# Patient Record
Sex: MALE | Race: WHITE | HISPANIC OR LATINO | Employment: STUDENT
[De-identification: names, ages, dates, MRNs, and addresses within clinical notes are randomized per-mention and may not be internally consistent; named-entity substitution may affect disease eponyms.]

---

## 2019-07-19 ENCOUNTER — HOSPITAL (OUTPATIENT)
Dept: OTHER | Age: 19
End: 2019-07-19

## 2019-07-19 LAB
AMORPH SED URNS QL MICRO: NORMAL
APPEARANCE UR: CLEAR
BILIRUB UR QL: NEGATIVE
CAOX CRY URNS QL MICRO: NORMAL
COLOR UR: YELLOW
EPITH CASTS #/AREA URNS LPF: NORMAL /[LPF]
FATTY CASTS #/AREA URNS LPF: NORMAL /[LPF]
GLUCOSE UR-MCNC: NEGATIVE MG/DL
GRAN CASTS #/AREA URNS LPF: NORMAL /[LPF]
HGB UR QL: NEGATIVE
HYALINE CASTS #/AREA URNS LPF: NORMAL /[LPF]
KETONES UR-MCNC: NEGATIVE MG/DL
LEUKOCYTE ESTERASE UR QL STRIP: NEGATIVE
MICROSCOPIC (MT): NORMAL
MIXED CELL CASTS #/AREA URNS LPF: NORMAL /[LPF]
MUCOUS THREADS URNS QL MICRO: NORMAL
NITRITE UR QL: NEGATIVE
PH UR: 5 UNITS (ref 5–7)
PROT UR QL: NEGATIVE MG/DL
RBC CASTS #/AREA URNS LPF: NORMAL /[LPF]
RENAL EPI CELLS #/AREA URNS HPF: NORMAL /[HPF]
SP GR UR: 1.02 (ref 1–1.03)
SPECIMEN SOURCE: NORMAL
SPERM URNS QL MICRO: NORMAL
T VAGINALIS URNS QL MICRO: NORMAL
TRI-PHOS CRY URNS QL MICRO: NORMAL
URATE CRY URNS QL MICRO: NORMAL
URNS CMNT MICRO: NORMAL
UROBILINOGEN UR QL: 0.2 MG/DL (ref 0–1)
WAXY CASTS #/AREA URNS LPF: NORMAL /[LPF]
WBC CASTS #/AREA URNS LPF: NORMAL /[LPF]
YEAST HYPHAE URNS QL MICRO: NORMAL
YEAST URNS QL MICRO: NORMAL

## 2020-07-25 ENCOUNTER — OFFICE VISIT (OUTPATIENT)
Dept: FAMILY MEDICINE | Age: 20
End: 2020-07-25

## 2020-07-25 VITALS
RESPIRATION RATE: 16 BRPM | WEIGHT: 189 LBS | BODY MASS INDEX: 24.26 KG/M2 | DIASTOLIC BLOOD PRESSURE: 78 MMHG | HEIGHT: 74 IN | HEART RATE: 98 BPM | TEMPERATURE: 99.3 F | OXYGEN SATURATION: 98 % | SYSTOLIC BLOOD PRESSURE: 120 MMHG

## 2020-07-25 DIAGNOSIS — E66.2 OBESITY HYPOVENTILATION SYNDROME (CMD): ICD-10-CM

## 2020-07-25 DIAGNOSIS — Z00.00 ANNUAL PHYSICAL EXAM: Primary | ICD-10-CM

## 2020-07-25 DIAGNOSIS — F17.210 CIGARETTE SMOKER: ICD-10-CM

## 2020-07-25 DIAGNOSIS — Z86.59 H/O: DEPRESSION: ICD-10-CM

## 2020-07-25 PROCEDURE — 99395 PREV VISIT EST AGE 18-39: CPT | Performed by: FAMILY MEDICINE

## 2020-07-25 PROCEDURE — 80061 LIPID PANEL: CPT | Performed by: FAMILY MEDICINE

## 2020-07-25 PROCEDURE — 80048 BASIC METABOLIC PNL TOTAL CA: CPT | Performed by: FAMILY MEDICINE

## 2020-07-25 PROCEDURE — 36415 COLL VENOUS BLD VENIPUNCTURE: CPT

## 2020-07-25 RX ORDER — FLUOXETINE HYDROCHLORIDE 20 MG/1
CAPSULE ORAL
COMMUNITY
Start: 2020-04-21

## 2020-07-25 SDOH — HEALTH STABILITY: MENTAL HEALTH: HOW OFTEN DO YOU HAVE A DRINK CONTAINING ALCOHOL?: NEVER

## 2020-07-26 LAB
ANION GAP SERPL CALC-SCNC: 10 MMOL/L (ref 10–20)
BUN SERPL-MCNC: 13 MG/DL (ref 6–20)
BUN/CREAT SERPL: 16 (ref 7–25)
CALCIUM SERPL-MCNC: 9.9 MG/DL (ref 8.4–10.2)
CHLORIDE SERPL-SCNC: 106 MMOL/L (ref 98–107)
CHOLEST SERPL-MCNC: 172 MG/DL
CHOLEST/HDLC SERPL: 3.8 {RATIO}
CO2 SERPL-SCNC: 30 MMOL/L (ref 21–32)
CREAT SERPL-MCNC: 0.83 MG/DL (ref 0.67–1.17)
GLUCOSE SERPL-MCNC: 101 MG/DL (ref 65–99)
HDLC SERPL-MCNC: 45 MG/DL
LDLC SERPL CALC-MCNC: 101 MG/DL
LENGTH OF FAST TIME PATIENT: ABNORMAL HRS
LENGTH OF FAST TIME PATIENT: ABNORMAL HRS
NONHDLC SERPL-MCNC: 127 MG/DL
POTASSIUM SERPL-SCNC: 4.6 MMOL/L (ref 3.4–5.1)
SODIUM SERPL-SCNC: 141 MMOL/L (ref 135–145)
TRIGL SERPL-MCNC: 130 MG/DL

## 2020-07-28 ENCOUNTER — TELEPHONE (OUTPATIENT)
Dept: SCHEDULING | Age: 20
End: 2020-07-28

## 2020-07-28 DIAGNOSIS — R93.89 CHEST X-RAY ABNORMALITY: Primary | ICD-10-CM

## 2020-08-05 ENCOUNTER — HOSPITAL ENCOUNTER (OUTPATIENT)
Dept: CT IMAGING | Age: 20
Discharge: HOME | End: 2020-08-05
Attending: FAMILY MEDICINE

## 2020-08-05 DIAGNOSIS — R93.89 CHEST X-RAY ABNORMALITY: ICD-10-CM

## 2020-08-05 PROCEDURE — 71250 CT THORAX DX C-: CPT

## 2024-03-11 PROBLEM — Z00.00 ENCOUNTER FOR PREVENTIVE HEALTH EXAMINATION: Status: ACTIVE | Noted: 2024-03-11

## 2024-03-12 ENCOUNTER — NON-APPOINTMENT (OUTPATIENT)
Age: 24
End: 2024-03-12

## 2024-03-14 ENCOUNTER — APPOINTMENT (OUTPATIENT)
Dept: SURGERY | Facility: CLINIC | Age: 24
End: 2024-03-14
Payer: COMMERCIAL

## 2024-03-14 VITALS
DIASTOLIC BLOOD PRESSURE: 73 MMHG | HEART RATE: 85 BPM | HEIGHT: 75 IN | WEIGHT: 315 LBS | SYSTOLIC BLOOD PRESSURE: 118 MMHG | BODY MASS INDEX: 39.17 KG/M2

## 2024-03-14 DIAGNOSIS — K80.20 CALCULUS OF GALLBLADDER W/OUT CHOLECYSTITIS W/OUT OBSTRUCTION: ICD-10-CM

## 2024-03-14 DIAGNOSIS — Z87.891 PERSONAL HISTORY OF NICOTINE DEPENDENCE: ICD-10-CM

## 2024-03-14 DIAGNOSIS — Z78.9 OTHER SPECIFIED HEALTH STATUS: ICD-10-CM

## 2024-03-14 DIAGNOSIS — F32.A DEPRESSION, UNSPECIFIED: ICD-10-CM

## 2024-03-14 PROCEDURE — 99203 OFFICE O/P NEW LOW 30 MIN: CPT

## 2024-03-14 NOTE — ASSESSMENT
[FreeTextEntry1] : IMP: Mr. MCKINLEY is a 23 year y/o M with symptomatic gallstones confirmed on imaging, and history and symptoms consistent with symptomatic cholelithiasis.

## 2024-03-14 NOTE — PLAN
[FreeTextEntry1] : Mr. LAM MCKINLEY was informed of significance of findings. All the options, risks and benefits were discussed at length, including the potential to go open, small potential for bleeding, CBD injury, bile leak, and other related complications. Informed consent for robotic assisted laparoscopic/possible open cholecystectomy and potential risks, benefits to the planned surgery were discussed in depth. All surgical options were discussed including non-surgical treatments. Patient wishes to proceed with surgery. We will plan for surgery at [facility], at the next available date, pending any required insurance pre-certification or pre-approval. Patient agrees to obtain any necessary pre-operative evaluations and testing prior to surgery. Patient advised to seek immediate medical attention with any acute change in symptoms or with the development of any new or worsening symptoms. Patient's questions and concerns addressed to patient's satisfaction, and patient verbalized an understanding of the information discussed.

## 2024-03-14 NOTE — DATA REVIEWED
[FreeTextEntry1] : Patient: LAM MCKINLEY YOB: 2000 Phone: [phone] MRN: [id number] Acc: [id number] Date of Exam: 02- EXAM:  ULTRASOUND ABDOMEN COMPLETE  HISTORY:  Male, 23 years-old with elevated liver function  TECHNIQUE:  Using real-time ultrasonography with a high-resolution broadband phased-array curved transducer, multiplanar gray scale images were obtained and supplemented with color Doppler. Static images are provided for review.  COMPARISON:  None  FINDINGS:   Abdominal Aorta/IVC: No evidence of abdominal aortic aneurysm. Visualized portions of the IVC were patent. Liver: Increased echogenicity is noted. It measures approximately 16 cm in length. Gallbladder: Cholelithiasis is noted. The walls do not appear thickened. Common Duct: Normal measuring 0.3 cm diameter at the jennifer hepatis. Pancreas: Obscured by overlying bowel Kidneys: Normal in size, morphology and cortical echotexture. There is no suspicious renal lesion. No hydronephrosis, shadowing calculi or perinephric fluid. Right Kidney: 13.4 cm in length. Left Kidney:   13.4 cm in length. Spleen: Normal size, contour and echotexture measuring 11.1 cm in length.  IMPRESSION: Cholelithiasis. No dilatation of the biliary tree is noted. Increased echogenicity of the liver suggestive of fatty infiltration or diffuse hepatocellular disease.

## 2024-03-14 NOTE — HISTORY OF PRESENT ILLNESS
[de-identified] : LAM MCKINLEY is a 23 year old M who is referred to the office for consultation visit, by PCP. Patient w history of elevated LFTs. Had an abdominal US ([facility]), 02/16/24, which had showed cholelithiasis. No dilatation of the biliary tree is noted. Increased echogenicity of the liver suggestive of fatty infiltration or diffuse hepatocellular disease. Patient complains of intermittent abdominal discomfort.

## 2024-03-14 NOTE — CONSULT LETTER
[Dear  ___] : Dear  [unfilled], [Consult Letter:] : I had the pleasure of evaluating your patient, [unfilled]. [Consult Closing:] : Thank you very much for allowing me to participate in the care of this patient.  If you have any questions, please do not hesitate to contact me. [Sincerely,] : Sincerely, [FreeTextEntry2] : Dr. Nas Morgan [FreeTextEntry3] : Chris Dooley MD

## 2024-04-04 ENCOUNTER — EMERGENCY (EMERGENCY)
Facility: HOSPITAL | Age: 24
LOS: 1 days | Discharge: HOME | End: 2024-04-04
Attending: EMERGENCY MEDICINE
Payer: COMMERCIAL

## 2024-04-04 VITALS
DIASTOLIC BLOOD PRESSURE: 82 MMHG | WEIGHT: 315 LBS | TEMPERATURE: 98 F | OXYGEN SATURATION: 96 % | HEIGHT: 75 IN | HEART RATE: 86 BPM | RESPIRATION RATE: 18 BRPM | SYSTOLIC BLOOD PRESSURE: 133 MMHG

## 2024-04-04 PROCEDURE — 99285 EMERGENCY DEPT VISIT HI MDM: CPT | Mod: 25

## 2024-04-04 NOTE — ED ADULT TRIAGE NOTE - CHIEF COMPLAINT QUOTE
pt reports that he has RUQ abdominal pain  about 1 hour associated with nausea  ,pt is schedule for Gall Stone surgery on 4/17/24

## 2024-04-05 VITALS
SYSTOLIC BLOOD PRESSURE: 130 MMHG | OXYGEN SATURATION: 98 % | DIASTOLIC BLOOD PRESSURE: 83 MMHG | HEART RATE: 76 BPM | RESPIRATION RATE: 18 BRPM | TEMPERATURE: 98 F

## 2024-04-05 LAB
ALBUMIN SERPL ELPH-MCNC: 3.9 G/DL — SIGNIFICANT CHANGE UP (ref 3.5–5)
ALP SERPL-CCNC: 81 U/L — SIGNIFICANT CHANGE UP (ref 40–120)
ALT FLD-CCNC: 181 U/L DA — HIGH (ref 10–60)
ANION GAP SERPL CALC-SCNC: 2 MMOL/L — LOW (ref 5–17)
APPEARANCE UR: CLEAR — SIGNIFICANT CHANGE UP
AST SERPL-CCNC: 81 U/L — HIGH (ref 10–40)
BASOPHILS # BLD AUTO: 0.05 K/UL — SIGNIFICANT CHANGE UP (ref 0–0.2)
BASOPHILS NFR BLD AUTO: 0.5 % — SIGNIFICANT CHANGE UP (ref 0–2)
BILIRUB SERPL-MCNC: 0.5 MG/DL — SIGNIFICANT CHANGE UP (ref 0.2–1.2)
BILIRUB UR-MCNC: NEGATIVE — SIGNIFICANT CHANGE UP
BUN SERPL-MCNC: 12 MG/DL — SIGNIFICANT CHANGE UP (ref 7–18)
CALCIUM SERPL-MCNC: 9.1 MG/DL — SIGNIFICANT CHANGE UP (ref 8.4–10.5)
CHLORIDE SERPL-SCNC: 108 MMOL/L — SIGNIFICANT CHANGE UP (ref 96–108)
CO2 SERPL-SCNC: 26 MMOL/L — SIGNIFICANT CHANGE UP (ref 22–31)
COLOR SPEC: YELLOW — SIGNIFICANT CHANGE UP
CREAT SERPL-MCNC: 0.96 MG/DL — SIGNIFICANT CHANGE UP (ref 0.5–1.3)
DIFF PNL FLD: ABNORMAL
EGFR: 112 ML/MIN/1.73M2 — SIGNIFICANT CHANGE UP
EOSINOPHIL # BLD AUTO: 0.08 K/UL — SIGNIFICANT CHANGE UP (ref 0–0.5)
EOSINOPHIL NFR BLD AUTO: 0.8 % — SIGNIFICANT CHANGE UP (ref 0–6)
GLUCOSE SERPL-MCNC: 121 MG/DL — HIGH (ref 70–99)
GLUCOSE UR QL: NEGATIVE MG/DL — SIGNIFICANT CHANGE UP
HCT VFR BLD CALC: 43.2 % — SIGNIFICANT CHANGE UP (ref 39–50)
HGB BLD-MCNC: 13.9 G/DL — SIGNIFICANT CHANGE UP (ref 13–17)
HIV 1 & 2 AB SERPL IA.RAPID: SIGNIFICANT CHANGE UP
IMM GRANULOCYTES NFR BLD AUTO: 0.2 % — SIGNIFICANT CHANGE UP (ref 0–0.9)
KETONES UR-MCNC: ABNORMAL MG/DL
LEUKOCYTE ESTERASE UR-ACNC: NEGATIVE — SIGNIFICANT CHANGE UP
LIDOCAIN IGE QN: 44 U/L — SIGNIFICANT CHANGE UP (ref 13–75)
LYMPHOCYTES # BLD AUTO: 2.71 K/UL — SIGNIFICANT CHANGE UP (ref 1–3.3)
LYMPHOCYTES # BLD AUTO: 28 % — SIGNIFICANT CHANGE UP (ref 13–44)
MCHC RBC-ENTMCNC: 28.5 PG — SIGNIFICANT CHANGE UP (ref 27–34)
MCHC RBC-ENTMCNC: 32.2 GM/DL — SIGNIFICANT CHANGE UP (ref 32–36)
MCV RBC AUTO: 88.5 FL — SIGNIFICANT CHANGE UP (ref 80–100)
MONOCYTES # BLD AUTO: 0.83 K/UL — SIGNIFICANT CHANGE UP (ref 0–0.9)
MONOCYTES NFR BLD AUTO: 8.6 % — SIGNIFICANT CHANGE UP (ref 2–14)
NEUTROPHILS # BLD AUTO: 6 K/UL — SIGNIFICANT CHANGE UP (ref 1.8–7.4)
NEUTROPHILS NFR BLD AUTO: 61.9 % — SIGNIFICANT CHANGE UP (ref 43–77)
NITRITE UR-MCNC: NEGATIVE — SIGNIFICANT CHANGE UP
NRBC # BLD: 0 /100 WBCS — SIGNIFICANT CHANGE UP (ref 0–0)
PH UR: 5.5 — SIGNIFICANT CHANGE UP (ref 5–8)
PLATELET # BLD AUTO: 296 K/UL — SIGNIFICANT CHANGE UP (ref 150–400)
POTASSIUM SERPL-MCNC: 4.1 MMOL/L — SIGNIFICANT CHANGE UP (ref 3.5–5.3)
POTASSIUM SERPL-SCNC: 4.1 MMOL/L — SIGNIFICANT CHANGE UP (ref 3.5–5.3)
PROT SERPL-MCNC: 7.9 G/DL — SIGNIFICANT CHANGE UP (ref 6–8.3)
PROT UR-MCNC: NEGATIVE MG/DL — SIGNIFICANT CHANGE UP
RBC # BLD: 4.88 M/UL — SIGNIFICANT CHANGE UP (ref 4.2–5.8)
RBC # FLD: 13.4 % — SIGNIFICANT CHANGE UP (ref 10.3–14.5)
RBC CASTS # UR COMP ASSIST: 22 /HPF — HIGH (ref 0–4)
SODIUM SERPL-SCNC: 136 MMOL/L — SIGNIFICANT CHANGE UP (ref 135–145)
SP GR SPEC: 1.02 — SIGNIFICANT CHANGE UP (ref 1–1.03)
UROBILINOGEN FLD QL: 1 MG/DL — SIGNIFICANT CHANGE UP (ref 0.2–1)
WBC # BLD: 9.69 K/UL — SIGNIFICANT CHANGE UP (ref 3.8–10.5)
WBC # FLD AUTO: 9.69 K/UL — SIGNIFICANT CHANGE UP (ref 3.8–10.5)
WBC UR QL: 2 /HPF — SIGNIFICANT CHANGE UP (ref 0–5)

## 2024-04-05 PROCEDURE — 85025 COMPLETE CBC W/AUTO DIFF WBC: CPT

## 2024-04-05 PROCEDURE — 86703 HIV-1/HIV-2 1 RESULT ANTBDY: CPT

## 2024-04-05 PROCEDURE — 80053 COMPREHEN METABOLIC PANEL: CPT

## 2024-04-05 PROCEDURE — 36415 COLL VENOUS BLD VENIPUNCTURE: CPT

## 2024-04-05 PROCEDURE — 83690 ASSAY OF LIPASE: CPT

## 2024-04-05 PROCEDURE — 99284 EMERGENCY DEPT VISIT MOD MDM: CPT | Mod: 25

## 2024-04-05 PROCEDURE — 74177 CT ABD & PELVIS W/CONTRAST: CPT | Mod: 26,MC

## 2024-04-05 PROCEDURE — 81001 URINALYSIS AUTO W/SCOPE: CPT

## 2024-04-05 PROCEDURE — 74177 CT ABD & PELVIS W/CONTRAST: CPT | Mod: MC

## 2024-04-05 RX ORDER — KETOROLAC TROMETHAMINE 30 MG/ML
15 SYRINGE (ML) INJECTION ONCE
Refills: 0 | Status: DISCONTINUED | OUTPATIENT
Start: 2024-04-05 | End: 2024-04-05

## 2024-04-05 RX ORDER — ONDANSETRON 8 MG/1
4 TABLET, FILM COATED ORAL ONCE
Refills: 0 | Status: COMPLETED | OUTPATIENT
Start: 2024-04-05 | End: 2024-04-05

## 2024-04-05 RX ORDER — MORPHINE SULFATE 50 MG/1
4 CAPSULE, EXTENDED RELEASE ORAL ONCE
Refills: 0 | Status: DISCONTINUED | OUTPATIENT
Start: 2024-04-05 | End: 2024-04-05

## 2024-04-05 RX ORDER — SODIUM CHLORIDE 9 MG/ML
1000 INJECTION INTRAMUSCULAR; INTRAVENOUS; SUBCUTANEOUS ONCE
Refills: 0 | Status: COMPLETED | OUTPATIENT
Start: 2024-04-05 | End: 2024-04-05

## 2024-04-05 NOTE — ED PROVIDER NOTE - CLINICAL SUMMARY MEDICAL DECISION MAKING FREE TEXT BOX
Given patient's history of gallstones will CT scan to look for cholecystitis status.CT scan with no gallstones, no cholecystitis, LFTs normal.   we recommend patient to improve his diet, weight loss, keep with diet for gastritis

## 2024-04-05 NOTE — ED ADULT NURSE NOTE - OBJECTIVE STATEMENT
As per pt. c/o abdominal pain that has since resolved. pt. refused Morphine, Toradol and fluids. Education provided buy patient still refused. No c/o of N/V/D

## 2024-04-05 NOTE — ED ADULT NURSE NOTE - CHIEF COMPLAINT QUOTE
Slurred speech
pt reports that he has RUQ abdominal pain  about 1 hour associated with nausea  ,pt is schedule for Gall Stone surgery on 4/17/24

## 2024-04-05 NOTE — ED ADULT NURSE NOTE - NSFALLUNIVINTERV_ED_ALL_ED
Bed/Stretcher in lowest position, wheels locked, appropriate side rails in place/Call bell, personal items and telephone in reach/Instruct patient to call for assistance before getting out of bed/chair/stretcher/Non-slip footwear applied when patient is off stretcher/[place] to call system/Physically safe environment - no spills, clutter or unnecessary equipment/Purposeful proactive rounding/Room/bathroom lighting operational, light cord in reach

## 2024-04-05 NOTE — ED PROVIDER NOTE - NSFOLLOWUPINSTRUCTIONS_ED_ALL_ED_FT
your CAT scan was completely normal showing no gallstones.  Your lab functions were also normal your AST and ALT were only slightly above normal.  Your story and your physical exam are not consistent with gallstones today.  Please consider improving your diet, taking out fatty and greasy foods.  Keeping a food log.  There is an toshia called [employer] that can make this very easy for you.  With a few changes you would likely improve your GI issues

## 2024-04-05 NOTE — ED PROVIDER NOTE - PHYSICAL EXAMINATION
General: Well appearing, obese no acute distress, appears stated age  HEENT: normocephalic, atraumatic   Respiratory: normal work of breathing  MSK: no swelling or tenderness of lower extremities, moving all extremities spontaneously   Skin: warm, dry  Neuro: A&Ox3, cranial nerves II-XII intact, 5/5 strength in all extremities, no sensory deficits, normal gait   Psych: appropriate affect

## 2024-04-05 NOTE — ED PROVIDER NOTE - OBJECTIVE STATEMENT
24-year-old male with reported gallstones presents with 15 minutes of right back pain radiating to his right upper quadrant so he comes to the emergency department.  He states that the pain resolved before presenting here.  He denies nausea vomiting or continued pain.   His primary doctor was concerned for fatty liver so got an ultrasound ultrasound showed gallstones so he has an appointment to have his gallstones removed later on this month.

## 2024-04-05 NOTE — ED PROVIDER NOTE - PATIENT PORTAL LINK FT
You can access the FollowMyHealth Patient Portal offered by [facility] by registering at the following website: [email or web address]. By joining [employer]’s FollowMyHealth portal, you will also be able to view your health information using other applications (apps) compatible with our system.

## 2024-04-11 ENCOUNTER — OUTPATIENT (OUTPATIENT)
Dept: OUTPATIENT SERVICES | Facility: HOSPITAL | Age: 24
LOS: 1 days | End: 2024-04-11
Payer: COMMERCIAL

## 2024-04-11 VITALS
RESPIRATION RATE: 16 BRPM | HEIGHT: 75 IN | OXYGEN SATURATION: 100 % | HEART RATE: 81 BPM | TEMPERATURE: 99 F | WEIGHT: 315 LBS | SYSTOLIC BLOOD PRESSURE: 110 MMHG | DIASTOLIC BLOOD PRESSURE: 82 MMHG

## 2024-04-11 DIAGNOSIS — K80.20 CALCULUS OF GALLBLADDER WITHOUT CHOLECYSTITIS WITHOUT OBSTRUCTION: ICD-10-CM

## 2024-04-11 DIAGNOSIS — Z01.818 ENCOUNTER FOR OTHER PREPROCEDURAL EXAMINATION: ICD-10-CM

## 2024-04-11 LAB — BLD GP AB SCN SERPL QL: SIGNIFICANT CHANGE UP

## 2024-04-11 PROCEDURE — G0463: CPT

## 2024-04-11 RX ORDER — SODIUM CHLORIDE 9 MG/ML
3 INJECTION INTRAMUSCULAR; INTRAVENOUS; SUBCUTANEOUS EVERY 8 HOURS
Refills: 0 | Status: DISCONTINUED | OUTPATIENT
Start: 2024-04-17 | End: 2024-05-01

## 2024-04-11 NOTE — H&P PST ADULT - HISTORY OF PRESENT ILLNESS
23 y/o male morbidly obese male with PMH of depression (off zoloft since 2021) complains of epigastric pain radiating to RUQ, abdominal bloating, change in bowel movements on and off since last year.  23 y/o male morbidly obese male with PMH of depression (off zoloft since 2021) and fatty liver complains of epigastric pain radiating to RUQ, abdominal bloating, change in bowel movements on and off since last year. He is diagnosed with calculus of the gallbladder without cholecystitis, without obstruction. Patient is scheduled for robotic assisted laparoscopic cholecystectomy on 4/17/24

## 2024-04-11 NOTE — H&P PST ADULT - NSICDXFAMILYHX_GEN_ALL_CORE_FT
FAMILY HISTORY:  Father  Still living? Unknown  FH: cholecystectomy, Age at diagnosis: Age Unknown    Mother  Still living? Yes, Estimated age: Age Unknown  FH: cholecystectomy, Age at diagnosis: Age Unknown

## 2024-04-11 NOTE — H&P PST ADULT - RESPIRATORY
normal/clear to auscultation bilaterally/no wheezes/no rales/no rhonchi/no use of accessory muscles/no subcutaneous emphysema/airway patent

## 2024-04-11 NOTE — H&P PST ADULT - PROBLEM SELECTOR PLAN 1
Scheduled for robotic assisted laparoscopic cholecystectomy on 4/17/24  Preoperative instructions discussed and given to patient.   Patient agrees to follow up with surgeon's office for instructions prior to surgery   Discussed preprocedure skin preparation using  chlorhexidine gluconate 4% solution three days prior to  surgery - including the day of surgery  Instructed patient to avoid aspirin and aspirin products, over the counter medications such as vitamins and herbal medications, one week prior to surgery.  Take Tylenol as needed for pain  Follow up with PCP postoperatively for management of chronic conditions  Patient verbalized understanding of instructions

## 2024-04-11 NOTE — H&P PST ADULT - ASSESSMENT
25 y/o male morbidly obese male (BMI 40) with PMH of depression (off zoloft since 2021) is diagnosed with  STOP BANG SCORE IS 3 25 y/o male morbidly obese male (BMI 40) with PMH of depression (off zoloft since 2021) is diagnosed with calculus of gallbladder without cholecystitis without obstruction  STOP BANG SCORE IS 3

## 2024-04-11 NOTE — H&P PST ADULT - NSICDXPASTMEDICALHX_GEN_ALL_CORE_FT
PAST MEDICAL HISTORY:  Eczema     GERD (gastroesophageal reflux disease)     Major depression     Morbidly obese

## 2024-04-11 NOTE — H&P PST ADULT - CARDIOVASCULAR
details… normal/regular rate and rhythm/S1 S2 present/no gallops/no rub/no murmur/normal PMI/no pedal edema

## 2024-04-16 ENCOUNTER — TRANSCRIPTION ENCOUNTER (OUTPATIENT)
Age: 24
End: 2024-04-16

## 2024-04-17 ENCOUNTER — APPOINTMENT (OUTPATIENT)
Dept: SURGERY | Facility: HOSPITAL | Age: 24
End: 2024-04-17

## 2024-04-17 ENCOUNTER — OUTPATIENT (OUTPATIENT)
Dept: OUTPATIENT SERVICES | Facility: HOSPITAL | Age: 24
LOS: 1 days | End: 2024-04-17
Payer: COMMERCIAL

## 2024-04-17 ENCOUNTER — RESULT REVIEW (OUTPATIENT)
Age: 24
End: 2024-04-17

## 2024-04-17 ENCOUNTER — TRANSCRIPTION ENCOUNTER (OUTPATIENT)
Age: 24
End: 2024-04-17

## 2024-04-17 VITALS
WEIGHT: 315 LBS | HEART RATE: 67 BPM | HEIGHT: 75 IN | RESPIRATION RATE: 16 BRPM | DIASTOLIC BLOOD PRESSURE: 64 MMHG | SYSTOLIC BLOOD PRESSURE: 102 MMHG | OXYGEN SATURATION: 97 % | TEMPERATURE: 98 F

## 2024-04-17 VITALS
DIASTOLIC BLOOD PRESSURE: 81 MMHG | HEART RATE: 88 BPM | SYSTOLIC BLOOD PRESSURE: 106 MMHG | RESPIRATION RATE: 14 BRPM | TEMPERATURE: 98 F | OXYGEN SATURATION: 100 %

## 2024-04-17 DIAGNOSIS — K80.20 CALCULUS OF GALLBLADDER WITHOUT CHOLECYSTITIS WITHOUT OBSTRUCTION: ICD-10-CM

## 2024-04-17 DIAGNOSIS — Z01.818 ENCOUNTER FOR OTHER PREPROCEDURAL EXAMINATION: ICD-10-CM

## 2024-04-17 LAB — BLD GP AB SCN SERPL QL: SIGNIFICANT CHANGE UP

## 2024-04-17 PROCEDURE — 36415 COLL VENOUS BLD VENIPUNCTURE: CPT

## 2024-04-17 PROCEDURE — 86850 RBC ANTIBODY SCREEN: CPT

## 2024-04-17 PROCEDURE — 86900 BLOOD TYPING SEROLOGIC ABO: CPT

## 2024-04-17 PROCEDURE — 88304 TISSUE EXAM BY PATHOLOGIST: CPT | Mod: 26

## 2024-04-17 PROCEDURE — 86901 BLOOD TYPING SEROLOGIC RH(D): CPT

## 2024-04-17 PROCEDURE — 47562 LAPAROSCOPIC CHOLECYSTECTOMY: CPT

## 2024-04-17 PROCEDURE — C9399: CPT

## 2024-04-17 PROCEDURE — S2900 ROBOTIC SURGICAL SYSTEM: CPT

## 2024-04-17 PROCEDURE — S2900: CPT

## 2024-04-17 PROCEDURE — 88304 TISSUE EXAM BY PATHOLOGIST: CPT

## 2024-04-17 DEVICE — LIGATING CLIPS WECK HEMOLOK POLYMER LARGE (PURPLE) 6: Type: IMPLANTABLE DEVICE | Status: FUNCTIONAL

## 2024-04-17 RX ORDER — SODIUM CHLORIDE 9 MG/ML
1000 INJECTION, SOLUTION INTRAVENOUS
Refills: 0 | Status: DISCONTINUED | OUTPATIENT
Start: 2024-04-17 | End: 2024-04-17

## 2024-04-17 RX ORDER — INDOCYANINE GREEN 25 MG
5 KIT INTRAVASCULAR; INTRAVENOUS ONCE
Refills: 0 | Status: COMPLETED | OUTPATIENT
Start: 2024-04-17 | End: 2024-04-17

## 2024-04-17 RX ORDER — TRAMADOL HYDROCHLORIDE 50 MG/1
1 TABLET ORAL
Qty: 12 | Refills: 0
Start: 2024-04-17 | End: 2024-04-19

## 2024-04-17 RX ORDER — HYDROMORPHONE HYDROCHLORIDE 2 MG/ML
0.5 INJECTION INTRAMUSCULAR; INTRAVENOUS; SUBCUTANEOUS
Refills: 0 | Status: DISCONTINUED | OUTPATIENT
Start: 2024-04-17 | End: 2024-04-17

## 2024-04-17 RX ADMIN — INDOCYANINE GREEN 5 MILLIGRAM(S): KIT INTRAVASCULAR; INTRAVENOUS at 06:42

## 2024-04-17 NOTE — ASU DISCHARGE PLAN (ADULT/PEDIATRIC) - CARE PROVIDER_API CALL
Chris Dooley.  Surgery  [address]  [place] [address]  Phone: [phone]  Fax: [phone]  Established Patient  Follow Up Time: 1 week

## 2024-04-17 NOTE — ASU DISCHARGE PLAN (ADULT/PEDIATRIC) - ASU DC SPECIAL INSTRUCTIONSFT
Medications  - Tylenol 650mg every 06 hours for 5 days, conditional to mild pain  - Tramadol 50 mg every 06 hours for 3 days, conditional to moderate pain not relieved by Tylenol     Dressings  - Keep you dressings on for 2 days, then you can take them out and shower regularly.  - Do not take the steri strips off, they will fall off on their own    Appointment  - Come to your appointment in 1 week for a post operative check-up

## 2024-04-17 NOTE — ASU DISCHARGE PLAN (ADULT/PEDIATRIC) - NURSING INSTRUCTIONS
Keep dressing dry, clean, intact for 2 days. Do not get incision wet for 48 hours (2 days). After 2 days, remove dressing (clear tape and gauze) and leave steri strips (white strips) on. You can shower with steri strips on. The steri strips will fall off on their own. Do not rub the steri strips off. Pat dry after shower. It sometimes take up to 10 days for the steri strips to fall off.

## 2024-04-17 NOTE — PACU DISCHARGE NOTE - NSPTMEETSDISCHCRITERIADT_GEN_A_CORE
,DirectAddress_Unknown,DirectAddress_Unknown,DirectAddress_Unknown,[email or web address] 17-Apr-2024

## 2024-04-17 NOTE — ASU DISCHARGE PLAN (ADULT/PEDIATRIC) - NS MD DC FALL RISK RISK
For information on Fall & Injury Prevention, visit: [email or web address] OR  [email or web address] OR  https://www.cdc.gov/steadi/patient.html

## 2024-04-18 PROBLEM — L30.9 DERMATITIS, UNSPECIFIED: Chronic | Status: ACTIVE | Noted: 2024-04-11

## 2024-04-18 PROBLEM — K21.9 GASTRO-ESOPHAGEAL REFLUX DISEASE WITHOUT ESOPHAGITIS: Chronic | Status: ACTIVE | Noted: 2024-04-11

## 2024-04-18 PROBLEM — E66.01 MORBID (SEVERE) OBESITY DUE TO EXCESS CALORIES: Chronic | Status: ACTIVE | Noted: 2024-04-11

## 2024-04-18 PROBLEM — F32.9 MAJOR DEPRESSIVE DISORDER, SINGLE EPISODE, UNSPECIFIED: Chronic | Status: ACTIVE | Noted: 2024-04-11

## 2024-04-23 LAB — SURGICAL PATHOLOGY STUDY: SIGNIFICANT CHANGE UP

## 2024-04-25 ENCOUNTER — APPOINTMENT (OUTPATIENT)
Dept: SURGERY | Facility: CLINIC | Age: 24
End: 2024-04-25
Payer: COMMERCIAL

## 2024-04-25 PROCEDURE — 99024 POSTOP FOLLOW-UP VISIT: CPT

## 2024-04-25 NOTE — HISTORY OF PRESENT ILLNESS
[de-identified] : LAM MCKINLEY is a 23 year old M who is referred to the office for consultation visit, by PCP. Patient w history of elevated LFTs. Had an abdominal US (R), 02/16/24, which had showed cholelithiasis. No dilatation of the biliary tree is noted. Increased echogenicity of the liver suggestive of fatty infiltration or diffuse hepatocellular disease. Patient complains of intermittent abdominal discomfort.   Here today for POSTOP, S/P robotic assisted laparoscopic cholecystectomy 04/17/24.

## 2024-04-25 NOTE — PHYSICAL EXAM
[TextEntry] : S/P robotic assisted laparoscopic cholecystectomy 04/17/24 S/p RA  Laparoscopic cholecystectomy. Doing well with no complaints. Sclera non icteric. Abdomen soft and non tender.  Wounds healing well.  Port sites with no erythema or drainage. Presently eating regular diet.

## 2024-04-25 NOTE — REASON FOR VISIT
[Post Op: _________] : a [unfilled] post op visit [FreeTextEntry1] : S/P robotic assisted laparoscopic cholecystectomy 04/17/24

## 2024-06-16 ENCOUNTER — EMERGENCY (EMERGENCY)
Facility: HOSPITAL | Age: 24
LOS: 1 days | Discharge: HOME | End: 2024-06-16
Attending: EMERGENCY MEDICINE
Payer: COMMERCIAL

## 2024-06-16 VITALS
OXYGEN SATURATION: 96 % | WEIGHT: 300.05 LBS | RESPIRATION RATE: 16 BRPM | DIASTOLIC BLOOD PRESSURE: 72 MMHG | HEIGHT: 75 IN | HEART RATE: 86 BPM | TEMPERATURE: 98 F | SYSTOLIC BLOOD PRESSURE: 114 MMHG

## 2024-06-16 RX ORDER — IBUPROFEN 200 MG
600 TABLET ORAL ONCE
Refills: 0 | Status: COMPLETED | OUTPATIENT
Start: 2024-06-16 | End: 2024-06-16

## 2024-06-16 RX ORDER — ACETAMINOPHEN 500 MG
975 TABLET ORAL ONCE
Refills: 0 | Status: COMPLETED | OUTPATIENT
Start: 2024-06-16 | End: 2024-06-16

## 2024-06-16 RX ADMIN — Medication 600 MILLIGRAM(S): at 14:20

## 2024-06-16 RX ADMIN — Medication 975 MILLIGRAM(S): at 14:20

## 2024-06-16 NOTE — ED PROVIDER NOTE - PATIENT PORTAL LINK FT
You can access the FollowMyHealth Patient Portal offered by [facility] by registering at the following website: [email or web address]. By joining [employer]’s FollowMyHealth portal, you will also be able to view your health information using other applications (apps) compatible with our system.

## 2024-06-16 NOTE — ED PROVIDER NOTE - NSFOLLOWUPINSTRUCTIONS_ED_ALL_ED_FT
1. Follow up with your primary care doctor and orthopedics in 1-2 days. Dr. Galvan [phone]  2. For pain, ibuprofen 400mg every 6hrs as needed for pain with food and or tylenol 650mg every 4 hours as needed. Apply ice to the area. Keep extremity elevated.   3. If patient develops increasing pain, develops weakness, numbness or tingling or any other concern return to the ER.

## 2024-06-16 NOTE — ED ADULT NURSE NOTE - OBJECTIVE STATEMENT
23y/o male presents to ER for ankle pain. Pt states he rolled his right ankle on Friday. Increased difficulty with walking and bearing weight. Pt did not take anything for pain. Safety maintained bed in lowest position and locked.

## 2024-06-16 NOTE — ED PROVIDER NOTE - CARE PROVIDER_API CALL
Claude Galvan  Orthopaedic Surgery  [address] [place] [address], [address]  [place] [address]  Phone: [phone]  Fax: [phone]  Follow Up Time:

## 2025-01-31 NOTE — ED ADULT TRIAGE NOTE - WEIGHT IN LBS
----- Message from Richard Panchal [phone]

## 2025-02-12 NOTE — PACU DISCHARGE NOTE - NS MD DISCHARGE NOTE DISCHARGE
Assessment  65M s/p MVR, LAAC, MAZE POD #15  Post op complicated by acute pulmonary insufficiency, FLAKITO, cardiogenic shock req inotropes    Plan:    Neuro:  Multimodal pain management - no toradol, minimize opiates  100 TID gabapentin  0.25 xanax PRN at night  Tylenol, Lidoderm patch, opiates as needed  Monitor neuro status in the post op period      CV:  S/P MVR, LAAC, MAZE  Monitor perfusion, , lactate, UOP, index and MVO2 if available  Maintain MAP > 65  Milrinone off 2/6  Dobutamine off 2/5  EP consulted for bradycardia resolved no need for Pacemaker  ASA,statin lopressor 12.5 q12      Resp:  Supplemental oxygen to maintain sats > 92%  RA 93%  Continuous pulse oximetry monitoring  BiPAP at night  NC during the day  IS / Chest PT  OOBTC and Ambulate  Nebulizers as needed - albuterol/atrovent  Advair    GI:  Heart healthy diet  Bowel Regimen - escalate as needed  PUD ppx per protocol    Renal  FLAKITO post surgery resolved   Baseline CKD  Monitor UOP, lytes, creatinine  Diuresis    Keep K > 4, Mg > 2  Flomax, finasteride    Endo:  Tight glucose control per CTICU protocol  Lantus 40 / premeal 10  SSI    Heme:  Acute blood loss anemia post surgery  High risk for thrombocytopenia  DVT ppx with HSQ  Iron / FA / MVI    ID:  Monitor fever curve and WBC count    Patient requires continuous monitoring with:  bedside rhythm monitoring, continuous  pulse oximetry monitoring, O2 supplement titrate for O2 sat above 90%  ;   Care plan discussed with CT ICU care team and attending surgeon Dr Zhao                         .  patient remain critical, at risk for life threatening decompensation; required more than usual post op care;   I have spent 35 minutes providing non routine post op care, revaluated multiple times.    Ivana Zhang MD  intensivist    Home IMPROVE VTE Individual Risk Assessment  RISK                                                                Points  [  ] Previous VTE                                                  3  [  ] Thrombophilia                                               2  [  ] Lower limb paralysis                                      2        (unable to hold up >15 seconds)    [  ] Current Cancer                                              2         (within 6 months)  [x  ] Immobilization > 24 hrs                                1  [  ] ICU/CCU stay > 24 hours                              1  [  x] Age > 60                                                      1    IMPROVE VTE Score 2  Lovenox 40mg SQ geoffrey;y

## 2025-04-20 ENCOUNTER — EMERGENCY (EMERGENCY)
Facility: HOSPITAL | Age: 25
LOS: 1 days | End: 2025-04-20
Attending: STUDENT IN AN ORGANIZED HEALTH CARE EDUCATION/TRAINING PROGRAM
Payer: COMMERCIAL

## 2025-04-20 VITALS
HEART RATE: 80 BPM | DIASTOLIC BLOOD PRESSURE: 73 MMHG | OXYGEN SATURATION: 98 % | RESPIRATION RATE: 18 BRPM | SYSTOLIC BLOOD PRESSURE: 108 MMHG | HEIGHT: 75 IN | TEMPERATURE: 98 F | WEIGHT: 289.91 LBS

## 2025-04-20 LAB
ALBUMIN SERPL ELPH-MCNC: 4.3 G/DL — SIGNIFICANT CHANGE UP (ref 3.3–5)
ALP SERPL-CCNC: 71 U/L — SIGNIFICANT CHANGE UP (ref 40–120)
ALT FLD-CCNC: 33 U/L — SIGNIFICANT CHANGE UP (ref 10–45)
ANION GAP SERPL CALC-SCNC: 13 MMOL/L — SIGNIFICANT CHANGE UP (ref 5–17)
AST SERPL-CCNC: 29 U/L — SIGNIFICANT CHANGE UP (ref 10–40)
BASOPHILS # BLD AUTO: 0.04 K/UL — SIGNIFICANT CHANGE UP (ref 0–0.2)
BASOPHILS NFR BLD AUTO: 0.5 % — SIGNIFICANT CHANGE UP (ref 0–2)
BILIRUB SERPL-MCNC: 0.3 MG/DL — SIGNIFICANT CHANGE UP (ref 0.2–1.2)
BUN SERPL-MCNC: 18 MG/DL — SIGNIFICANT CHANGE UP (ref 7–23)
CALCIUM SERPL-MCNC: 9.5 MG/DL — SIGNIFICANT CHANGE UP (ref 8.4–10.5)
CHLORIDE SERPL-SCNC: 105 MMOL/L — SIGNIFICANT CHANGE UP (ref 96–108)
CO2 SERPL-SCNC: 23 MMOL/L — SIGNIFICANT CHANGE UP (ref 22–31)
CREAT SERPL-MCNC: 0.88 MG/DL — SIGNIFICANT CHANGE UP (ref 0.5–1.3)
EGFR: 122 ML/MIN/1.73M2 — SIGNIFICANT CHANGE UP
EGFR: 122 ML/MIN/1.73M2 — SIGNIFICANT CHANGE UP
EOSINOPHIL # BLD AUTO: 0.15 K/UL — SIGNIFICANT CHANGE UP (ref 0–0.5)
EOSINOPHIL NFR BLD AUTO: 1.7 % — SIGNIFICANT CHANGE UP (ref 0–6)
GLUCOSE SERPL-MCNC: 101 MG/DL — HIGH (ref 70–99)
HCT VFR BLD CALC: 41.5 % — SIGNIFICANT CHANGE UP (ref 39–50)
HGB BLD-MCNC: 13.7 G/DL — SIGNIFICANT CHANGE UP (ref 13–17)
IMM GRANULOCYTES NFR BLD AUTO: 0.2 % — SIGNIFICANT CHANGE UP (ref 0–0.9)
LYMPHOCYTES # BLD AUTO: 3.67 K/UL — HIGH (ref 1–3.3)
LYMPHOCYTES # BLD AUTO: 42.5 % — SIGNIFICANT CHANGE UP (ref 13–44)
MAGNESIUM SERPL-MCNC: 2 MG/DL — SIGNIFICANT CHANGE UP (ref 1.6–2.6)
MCHC RBC-ENTMCNC: 28.3 PG — SIGNIFICANT CHANGE UP (ref 27–34)
MCHC RBC-ENTMCNC: 33 G/DL — SIGNIFICANT CHANGE UP (ref 32–36)
MCV RBC AUTO: 85.7 FL — SIGNIFICANT CHANGE UP (ref 80–100)
MONOCYTES # BLD AUTO: 0.73 K/UL — SIGNIFICANT CHANGE UP (ref 0–0.9)
MONOCYTES NFR BLD AUTO: 8.4 % — SIGNIFICANT CHANGE UP (ref 2–14)
NEUTROPHILS # BLD AUTO: 4.03 K/UL — SIGNIFICANT CHANGE UP (ref 1.8–7.4)
NEUTROPHILS NFR BLD AUTO: 46.7 % — SIGNIFICANT CHANGE UP (ref 43–77)
NRBC BLD AUTO-RTO: 0 /100 WBCS — SIGNIFICANT CHANGE UP (ref 0–0)
PLATELET # BLD AUTO: 258 K/UL — SIGNIFICANT CHANGE UP (ref 150–400)
POTASSIUM SERPL-MCNC: 4.1 MMOL/L — SIGNIFICANT CHANGE UP (ref 3.5–5.3)
POTASSIUM SERPL-SCNC: 4.1 MMOL/L — SIGNIFICANT CHANGE UP (ref 3.5–5.3)
PROT SERPL-MCNC: 7.2 G/DL — SIGNIFICANT CHANGE UP (ref 6–8.3)
RBC # BLD: 4.84 M/UL — SIGNIFICANT CHANGE UP (ref 4.2–5.8)
RBC # FLD: 13.3 % — SIGNIFICANT CHANGE UP (ref 10.3–14.5)
SODIUM SERPL-SCNC: 141 MMOL/L — SIGNIFICANT CHANGE UP (ref 135–145)
TROPONIN T, HIGH SENSITIVITY RESULT: 8 NG/L — SIGNIFICANT CHANGE UP (ref 0–51)
WBC # BLD: 8.64 K/UL — SIGNIFICANT CHANGE UP (ref 3.8–10.5)
WBC # FLD AUTO: 8.64 K/UL — SIGNIFICANT CHANGE UP (ref 3.8–10.5)

## 2025-04-20 PROCEDURE — 71046 X-RAY EXAM CHEST 2 VIEWS: CPT | Mod: 26

## 2025-04-20 PROCEDURE — 93010 ELECTROCARDIOGRAM REPORT: CPT

## 2025-04-20 PROCEDURE — 99285 EMERGENCY DEPT VISIT HI MDM: CPT

## 2025-04-20 NOTE — ED ADULT TRIAGE NOTE - HEART RATE (BEATS/MIN)
80 Azithromycin Pregnancy And Lactation Text: This medication is considered safe during pregnancy and is also secreted in breast milk.

## 2025-04-20 NOTE — ED PROVIDER NOTE - ATTENDING CONTRIBUTION TO CARE
Lj Parekh DO: I have personally performed a face to face medical and diagnostic evaluation of the patient. I have discussed with and reviewed the Resident's and/or ACP's and/or Medical/PA/NP student's note and agree with the History, ROS, Physical Exam and MDM unless otherwise indicated. A brief summary of my personal evaluation and impression can be found below.     25-year-old male with a history of eczema, GERD, status post cholecystectomy, present ED for episodes of palpitations from home earlier today.  Patient was at home, felt heart racing, resolved with deep breathing was concerned and came to the ED.  No shortness of breath no recent travel no nausea no vomiting no diarrhea no abdominal pain.  Patient denies any substance use.    CONSTITUTIONAL: NAD,  SKIN: Warm dry, normal skin turgor  HEAD: NCAT  EYES: EOMI, PERRLA, no scleral icterus, conjunctiva pink  NECK: Supple; non tender. Full ROM.  CARD: Regular rate, occasional irregular beat  RESP: No respiratory distress  ABD: non-distended, no abdominal tenderness, obese abdomen  MSK: Full ROM, no leg swelling  PSYCH: Cooperative, appropriate.     Low likelihood ACS, PE also low likelihood, patient has been having frequent episodes of palpitations, somewhat irregular EKG, will monitor on telemetry monitoring overnight, perform echocardiogram likely in the morning with CDU stay.  Will get serial troponins, EKG, low likelihood infectious etiology, anticipate obs with discharge after echocardiogram

## 2025-04-20 NOTE — ED PROVIDER NOTE - CLINICAL SUMMARY MEDICAL DECISION MAKING FREE TEXT BOX
25-year-old male with a history of eczema, GERD, s/p cholecystectomy (2024) presenting with an episode of palpitations from home earlier today.  Concern for PVCs versus ACS.  Labs, EKG, CXR ordered.

## 2025-04-20 NOTE — ED PROVIDER NOTE - OBJECTIVE STATEMENT
25-year-old male with a history of eczema, GERD, s/p cholecystectomy (2024) presenting with an episode of palpitations from home earlier today.  Per patient he was at home when he suddenly felt his heart racing.  The episode resolved with some deep breathing but he was concerned and presented to the ED for further evaluation.  He otherwise has no significant headache, chest pain, shortness of breath, N/V/D/abdominal pain, dysuria, peripheral edema, fever/chills.  NKDA.  No significant substance use.

## 2025-04-20 NOTE — ED PROVIDER NOTE - PROGRESS NOTE DETAILS
[place] PGY3 - Lab work grossly unremarkable.  Repeat troponin 6 from 8.  CXR shows clear lungs.  CDU for echo in the morning prior to discharge.

## 2025-04-20 NOTE — ED ADULT NURSE NOTE - OBJECTIVE STATEMENT
25 year old male coming to ED complaining of chest palpitations. A&ox4. No significant PMH. Patient ambulatory independently with steady gait. Patient states he bent down forward around 9:00pm and began having chest palpitations. Patient states the episode lasted around 5 minutes. Patient states it resolved after sitting in a chair and taking deep breaths. Patient denies any symptoms at this time. Patient breathing spontaneously and unlabored.

## 2025-04-21 ENCOUNTER — RESULT REVIEW (OUTPATIENT)
Age: 25
End: 2025-04-21

## 2025-04-21 VITALS
DIASTOLIC BLOOD PRESSURE: 73 MMHG | RESPIRATION RATE: 18 BRPM | TEMPERATURE: 98 F | HEART RATE: 89 BPM | OXYGEN SATURATION: 98 % | SYSTOLIC BLOOD PRESSURE: 121 MMHG

## 2025-04-21 LAB
TROPONIN T, HIGH SENSITIVITY RESULT: 6 NG/L — SIGNIFICANT CHANGE UP (ref 0–51)
TSH SERPL-MCNC: 4.27 UIU/ML — HIGH (ref 0.27–4.2)

## 2025-04-21 PROCEDURE — 84443 ASSAY THYROID STIM HORMONE: CPT

## 2025-04-21 PROCEDURE — 85025 COMPLETE CBC W/AUTO DIFF WBC: CPT

## 2025-04-21 PROCEDURE — 84484 ASSAY OF TROPONIN QUANT: CPT

## 2025-04-21 PROCEDURE — 99285 EMERGENCY DEPT VISIT HI MDM: CPT | Mod: 25

## 2025-04-21 PROCEDURE — 80053 COMPREHEN METABOLIC PANEL: CPT

## 2025-04-21 PROCEDURE — C8929: CPT

## 2025-04-21 PROCEDURE — 93005 ELECTROCARDIOGRAM TRACING: CPT

## 2025-04-21 PROCEDURE — G0378: CPT

## 2025-04-21 PROCEDURE — 93306 TTE W/DOPPLER COMPLETE: CPT | Mod: 26

## 2025-04-21 PROCEDURE — 99223 1ST HOSP IP/OBS HIGH 75: CPT

## 2025-04-21 PROCEDURE — 71046 X-RAY EXAM CHEST 2 VIEWS: CPT

## 2025-04-21 PROCEDURE — 83735 ASSAY OF MAGNESIUM: CPT

## 2025-04-21 NOTE — ED CDU PROVIDER INITIAL DAY NOTE - DETAILS
25-year-old male with a history of eczema, GERD, s/p cholecystectomy (2024) presenting with an episode of palpitations from home earlier today.  Plan: frequent reeval, vitals q 4hrs, telemetry monitoring and TTE.

## 2025-04-21 NOTE — ED CDU PROVIDER DISPOSITION NOTE - ATTENDING APP SHARED VISIT CONTRIBUTION OF CARE
RGUJRAL 26yo male hx cholecystectomy presented with palpitations yesterday. States he had an episode yesterday when he was leaning forward that he was able to improve with deep and slow breaths. Denies any chest pain, shortness of breath, recent illness, travel, hx DVT/PE. Pt is a non smoker, social alcohol and no drug abuse. States he does intake caffeine and may have not drank enough water. Denies any family history of CAD or arrhythmia. Pt on arrival noted to have PVCs. Denies any recurrent episodes since in the hospital. Labs and xray reviewed. On exam, Patient is awake,alert,oriented x 3. Patient is well appearing and in no acute distress. Patient's chest is clear to ausculation, +s1s2. Abdomen is soft nd/nt +BS. Extremity with no swelling or calf tenderness.   Pt noted to have occasional PVC on the monitor. Discussed follow up with his PCP and will provide Cardiology follow up as well. ECHO results reviewed and TSH levels to be followed outpatient with his PCP.     1. Left ventricular cavity is normal in size. Left ventricular systolic function is normal with an ejection fraction of 57 % by Dewey's method of disks. There are no regional wall motion abnormalities seen.   2. Normal right ventricular cavity size and normal right ventricular systolic function.   3. No significant valvular disease.   4. No pericardial effusion seen.   5. No prior echocardiogram is available for comparison.  Patient ambulatory in the CDU, denies any symptoms at this time, will discharge with follow up and strict return precautions.

## 2025-04-21 NOTE — ED CDU PROVIDER INITIAL DAY NOTE - OBJECTIVE STATEMENT
25-year-old male with a history of eczema, GERD, s/p cholecystectomy (2024) presenting with an episode of palpitations from home earlier today.  Per patient he was at home when he suddenly felt his heart racing.  The episode resolved with some deep breathing but he was concerned and presented to the ED for further evaluation.  He otherwise has no significant headache, chest pain, shortness of breath, N/V/D/abdominal pain, dysuria, peripheral edema, fever/chills.  NKDA.  No significant substance use.  In ED, patient had ekg no signs of acute ischemia, troponin x 2 negative, chest x ray no signs of acute pathology. Pt sent to CDU for frequent reeval, vitals q 4hrs, telemetry monitoring and TTE.

## 2025-04-21 NOTE — ED CDU PROVIDER DISPOSITION NOTE - NSFOLLOWUPCLINICS_GEN_ALL_ED_FT
Pt informed, E-Rx   Cardiology at [facility]  Cardiology  [address] [place] [address]  Phone: [phone]  Fax:

## 2025-04-21 NOTE — ED ADULT NURSE REASSESSMENT NOTE - NS ED NURSE REASSESS COMMENT FT1
bedside echo in progress
recliner provided to pt, while pregnant wife in hospital bed
pt sitting up in bedside chair, denies all c/o well appearing, NSR on tele, clear lungs, no pedal edema, skin wdi, wife present, TTE pending, TSH lab sent

## 2025-04-21 NOTE — ED CDU PROVIDER DISPOSITION NOTE - CLINICAL COURSE
25-year-old male with a history of eczema, GERD, s/p cholecystectomy (2024) presenting with an episode of palpitations from home earlier today.  Per patient he was at home when he suddenly felt his heart racing.  The episode resolved with some deep breathing but he was concerned and presented to the ED for further evaluation.  He otherwise has no significant headache, chest pain, shortness of breath, N/V/D/abdominal pain, dysuria, peripheral edema, fever/chills.  NKDA.  No significant substance use.  In ED, patient had ekg no signs of acute ischemia, troponin x 2 negative, chest x ray no signs of acute pathology. Pt sent to CDU for frequent reeval, vitals q 4hrs, telemetry monitoring and TTE. 25-year-old male with a history of eczema, GERD, s/p cholecystectomy (2024) presenting with an episode of palpitations from home earlier today.  Per patient he was at home when he suddenly felt his heart racing.  The episode resolved with some deep breathing but he was concerned and presented to the ED for further evaluation.  He otherwise has no significant headache, chest pain, shortness of breath, N/V/D/abdominal pain, dysuria, peripheral edema, fever/chills.  NKDA.  No significant substance use.  In ED, patient had ekg no signs of acute ischemia, troponin x 2 negative, chest x ray no signs of acute pathology. Pt sent to CDU for frequent reeval, vitals q 4hrs, telemetry monitoring and TTE.  In the cdu, Patient feeling well in the CDU overnight intermittent episodes of PVCs on telemetry.  Patient asymptomatic.  TTE unremarkable.  Will DC home with close outpatient PCP and cardiology follow-up.  Strict return precautions provided.

## 2025-04-21 NOTE — ED CDU PROVIDER DISPOSITION NOTE - NSFOLLOWUPINSTRUCTIONS_ED_ALL_ED_FT
1) Follow-up with your Primary Medical Doctor or referred doctor. Call today / next business day for prompt follow-up.  2) Return to Emergency room for any worsening or persistent pain, weakness, fever, or any other concerning symptoms.  3) See attached instruction sheets for additional information, including information regarding signs and symptoms to look out for, reasons to seek immediate care and other important instructions.  4) Follow-up with any specialists as discussed / noted as well. Stay well-hydrated.  Avoid excessive caffeine use.  Follow-up with your PCP in 2 to 3 days.  Bring copy of your results with you to your appointment.  Please discuss elevated TSH levels with your doctor.  Follow-up with cardiology within 1 to 2 weeks.  Someone will call you in the next few days to help you schedule an appointment.  Return to the ER for recurrent palpitations, chest pain, shortness of breath, lightheadedness/dizziness or any other concerning symptoms.

## 2025-04-21 NOTE — ED CDU PROVIDER DISPOSITION NOTE - PATIENT PORTAL LINK FT
You can access the FollowMyHealth Patient Portal offered by [facility] by registering at the following website: [email or web address]. By joining [facility]’s FollowMyHealth portal, you will also be able to view your health information using other applications (apps) compatible with our system.

## 2025-04-21 NOTE — ED CDU PROVIDER INITIAL DAY NOTE - PROGRESS NOTE DETAILS
Patient feeling well in the CDU overnight intermittent episodes of PVCs on telemetry.  Patient asymptomatic.  TTE unremarkable.  Will DC home with close outpatient PCP and cardiology follow-up.  Strict return precautions provided. -Mick Villanueva PA-C

## 2025-05-13 ENCOUNTER — NON-APPOINTMENT (OUTPATIENT)
Age: 25
End: 2025-05-13
